# Patient Record
Sex: FEMALE | Race: WHITE | HISPANIC OR LATINO | Employment: OTHER | URBAN - METROPOLITAN AREA
[De-identification: names, ages, dates, MRNs, and addresses within clinical notes are randomized per-mention and may not be internally consistent; named-entity substitution may affect disease eponyms.]

---

## 2023-01-28 ENCOUNTER — APPOINTMENT (EMERGENCY)
Dept: RADIOLOGY | Facility: HOSPITAL | Age: 78
End: 2023-01-28

## 2023-01-28 ENCOUNTER — HOSPITAL ENCOUNTER (EMERGENCY)
Facility: HOSPITAL | Age: 78
Discharge: HOME | End: 2023-01-28
Attending: EMERGENCY MEDICINE

## 2023-01-28 VITALS
DIASTOLIC BLOOD PRESSURE: 90 MMHG | TEMPERATURE: 97.2 F | OXYGEN SATURATION: 97 % | SYSTOLIC BLOOD PRESSURE: 148 MMHG | RESPIRATION RATE: 18 BRPM | HEART RATE: 87 BPM

## 2023-01-28 DIAGNOSIS — M25.521 RIGHT ELBOW PAIN: Primary | ICD-10-CM

## 2023-01-28 RX ORDER — ACETAMINOPHEN 325 MG/1
975 TABLET ORAL ONCE
Status: COMPLETED | OUTPATIENT
Start: 2023-01-28 | End: 2023-01-28

## 2023-01-28 RX ADMIN — ACETAMINOPHEN 975 MG: 325 TABLET ORAL at 13:36

## 2023-01-28 NOTE — ED ATTENDING ATTESTATION
1/28/2023  Jose Rafael CARRASCO DO, saw and evaluated the patient  I have discussed the patient with the resident/non-physician practitioner and agree with the resident's/non-physician practitioner's findings, Plan of Care, and MDM as documented in the resident's/non-physician practitioner's note, except where noted  All available labs and Radiology studies were reviewed  I was present for key portions of any procedure(s) performed by the resident/non-physician practitioner and I was immediately available to provide assistance  At this point I agree with the current assessment done in the Emergency Department  I have conducted an independent evaluation of this patient a history and physical is as follows:    72-year-old female presents for evaluation of acute onset right elbow pain localized to the cubital tunnel itself starting earlier this morning while "relaxing" pain described as constant, worse with palpation, no other alleviating or exacerbating factors  Denies focal weakness, numbness, tingling  Denies fever, chills denies trauma to the area  No other complaints this time  Skin intact over the right elbow  no erythema, no induration, no fluctuance  Sensation intact to light touch median, ulnar, radial  Motor intact anterior, posterior interosseous, median, ulnar, radial      Impression: Right elbow pain likely MSK  Point-of-care ultrasound utilized to evaluate for joint effusion, none present  This makes septic arthritis much less likely  Doubt crystalline arthropathy for same reason  There is no evidence of cellulitis at this time  plan: We will obtain x-ray of right elbow  Recommend anti-inflammatories        ED Course         Critical Care Time  Procedures

## 2023-01-28 NOTE — DISCHARGE INSTRUCTIONS
You have been evaluated in the Emergency Department today for right elbow pain  Your evaluation did not find evidence of medical conditions requiring emergent intervention at this time  You may take ibuprofen every 6 hours or tylenol every 6 hours as needed for pain  Please follow up with orthopedics  Call to schedule an appointment  Please schedule an appointment for follow up with your primary care physician this week  Return to the Emergency Department if you experience worsening pain, numbness, tingling, change of color in your fingers, or any other concerning symptoms

## 2023-01-30 NOTE — ED PROVIDER NOTES
History  Chief Complaint   Patient presents with   • Arm Pain     Pt was relaxing yesterday evening when she began with arm pain  Reports its been getting progressively worse  Patient is a 68 YO F, pmhx of HTN, who presents to the ED for right arm pain  Pain started suddenly yesterday afternoon  No reported trauma  Mainly pain is at the inner aspect of her R elbow  Pain is somewhat worse with certain movements, relieved with rest  No other modifying factors  No associated symptoms  Denies fevers, chills  No history of symptoms like this in the past   No history of gout  History provided by:  Patient   used: No    Arm Pain  Associated symptoms: no fever and no rash        None       Past Medical History:   Diagnosis Date   • Hypertension        History reviewed  No pertinent surgical history  History reviewed  No pertinent family history  I have reviewed and agree with the history as documented  E-Cigarette/Vaping     E-Cigarette/Vaping Substances     Social History     Tobacco Use   • Smoking status: Never   • Smokeless tobacco: Never   Substance Use Topics   • Drug use: Never        Review of Systems   Constitutional: Negative for chills and fever  Musculoskeletal:        Right elbow pain   Skin: Negative for color change, rash and wound  All other systems reviewed and are negative  Physical Exam  ED Triage Vitals [01/28/23 1250]   Temperature Pulse Respirations Blood Pressure SpO2   (!) 97 2 °F (36 2 °C) 87 18 148/90 97 %      Temp Source Heart Rate Source Patient Position - Orthostatic VS BP Location FiO2 (%)   Tympanic Monitor -- Left arm --      Pain Score       9             Orthostatic Vital Signs  Vitals:    01/28/23 1250   BP: 148/90   Pulse: 87       Physical Exam  Vitals and nursing note reviewed  Constitutional:       General: She is not in acute distress  Appearance: She is well-developed  She is not diaphoretic     HENT:      Head: Normocephalic and atraumatic  Right Ear: External ear normal       Left Ear: External ear normal       Nose: Nose normal    Eyes:      General: Lids are normal  No scleral icterus  Cardiovascular:      Rate and Rhythm: Normal rate and regular rhythm  Heart sounds: Normal heart sounds  No murmur heard  No friction rub  No gallop  Pulmonary:      Effort: Pulmonary effort is normal  No respiratory distress  Musculoskeletal:         General: No deformity  Normal range of motion  Arms:       Cervical back: Normal range of motion and neck supple  Comments: There is moderate tenderness over the medial right elbow  Full range of motion  No crepitus  No overlying skin changes  No edema or swelling  2+ right radial pulse   strength intact  Skin:     General: Skin is warm and dry  Neurological:      General: No focal deficit present  Mental Status: She is alert  Psychiatric:         Mood and Affect: Mood normal          Behavior: Behavior normal          ED Medications  Medications   acetaminophen (TYLENOL) tablet 975 mg (975 mg Oral Given 1/28/23 1336)       Diagnostic Studies  Results Reviewed     None                 XR elbow 3+ views RIGHT   ED Interpretation by Grady Padilla DO (01/28 1359)   No acute osseous abnormality      Final Result by Madelyn Fall MD (01/29 9930)      No acute osseous abnormality  Workstation performed: [id number]               Procedures  Procedures      ED Course  ED Course as of 01/30/23 1413   Sat Jan 28, 2023   1400 Patient reevaluated  Resting comfortably  States she is feeling better  Discussed negative x-rays  As no further imaging or work-up indicated at this time will discharge  Recommended PCP follow-up  Return precautions discussed  Patient verbalized understanding and agreed to plan of care                 Identification of Seniors at [address] [place] [address] Most Recent Value   (ISAR) Identification of Seniors at Risk    Before the illness or injury that brought you to the Emergency, did you need someone to help you on a regular basis? 0 Filed at: 01/28/2023 1255   In the last 24 hours, have you needed more help than usual? 0 Filed at: 01/28/2023 1255   Have you been hospitalized for one or more nights during the past 6 months? 0 Filed at: 01/28/2023 1255   In general, do you see well? 0 Filed at: 01/28/2023 1255   In general, do you have serious problems with your memory? 0 Filed at: 01/28/2023 1255   Do you take more than three different medications every day? 1 Filed at: 01/28/2023 1255   ISAR Score 1 Filed at: 01/28/2023 1255                              Medical Decision Making  Patient is a 69 y o  female who presents to the ED for atraumatic right elbow pain  Patient is nontoxic, well-appearing  Vitals are stable  No signs or symptoms of systemic toxicity  Unclear etiology of pain  Likely msk strain  Given full range of motion and no reported trauma, I doubt fracture and dislocation  Presentation not consistent with septic joint given no fevers or other systemic symptoms, in addition to no overlying skin changes and no swelling  Very low suspicion for gout  Plan: Symptomatic control, plain films, reassessment                 Portions of the record may have been created with voice recognition software  Occasional wrong word or "sound a like" substitutions may have occurred due to the inherent limitations of voice recognition software  Read the chart carefully and recognize, using context, where substitutions have occurred  Right elbow pain: acute illness or injury  Amount and/or Complexity of Data Reviewed  Radiology: ordered and independent interpretation performed  Details: No acute osseous abnormality      Risk  OTC drugs  Prescription drug management              Disposition  Final diagnoses:   Right elbow pain     Time reflects when diagnosis was documented in both MDM as applicable and the Disposition within this note     Time User Action Codes Description Comment    1/28/2023  2:21 PM Para Hiland Add [E09 372] Right elbow pain       ED Disposition     ED Disposition   Discharge    Condition   Stable    Date/Time   Sat Jan 28, 2023  2:04 PM    Comment   Danisha Cira discharge to home/self care  Follow-up Information     Follow up With Specialties Details Why Contact Info Additional Information    Aditi Tripp    [address] [facility]  [address]  [facility] [phone] [address] Emergency Department Emergency Medicine  As needed [address] [phone]  [address] [facility] [address] [facility] Emergency Department, [address] [place], [place], [address] [facility] Orthopedic Surgery Schedule an appointment as soon as possible for a visit   [address] [phone]  [phone] [address] [place] [address], [address] [place], [place], [address]  [facility]  Use Entrance A           There are no discharge medications for this patient  No discharge procedures on file  PDMP Review     None           ED Provider  Attending physically available and evaluated David Alvares managed the patient along with the ED Attending      Electronically Signed by         Mario Sagastume DO  01/30/23 2000